# Patient Record
Sex: MALE | Race: WHITE | NOT HISPANIC OR LATINO | URBAN - METROPOLITAN AREA
[De-identification: names, ages, dates, MRNs, and addresses within clinical notes are randomized per-mention and may not be internally consistent; named-entity substitution may affect disease eponyms.]

---

## 2022-12-09 ENCOUNTER — TELEPHONE (OUTPATIENT)
Dept: DERMATOLOGY | Facility: CLINIC | Age: 27
End: 2022-12-09

## 2022-12-09 NOTE — TELEPHONE ENCOUNTER
Spoke w/ pt mom. Mom advised that at this time there are no providers in office taking new pt. Mom states understanding.

## 2022-12-09 NOTE — TELEPHONE ENCOUNTER
----- Message from Yessica Greer sent at 12/9/2022  2:07 PM CST -----  Contact: Patient's Spouse  Type:  Sooner Appointment Request    Caller is requesting a sooner appointment.  Caller declined first available appointment listed below.  Caller will not accept being placed on the waitlist and is requesting a message be sent to doctor.    Name of Caller: Patient's Spouse  When is the first available appointment? N/a  Symptoms: rash  Best Call Back Number: [phone]  Additional Information:  Please contact patient's Spouse to schedule a sooner appointment.